# Patient Record
Sex: MALE | Race: WHITE
[De-identification: names, ages, dates, MRNs, and addresses within clinical notes are randomized per-mention and may not be internally consistent; named-entity substitution may affect disease eponyms.]

---

## 2019-01-23 ENCOUNTER — HOSPITAL ENCOUNTER (OUTPATIENT)
Dept: HOSPITAL 46 - ED | Age: 84
Setting detail: OBSERVATION
LOS: 1 days | Discharge: HOME | End: 2019-01-24
Attending: INTERNAL MEDICINE | Admitting: INTERNAL MEDICINE
Payer: MEDICARE

## 2019-01-23 VITALS — HEIGHT: 66 IN | BODY MASS INDEX: 33.12 KG/M2 | WEIGHT: 206.1 LBS

## 2019-01-23 DIAGNOSIS — Z79.891: ICD-10-CM

## 2019-01-23 DIAGNOSIS — G89.29: ICD-10-CM

## 2019-01-23 DIAGNOSIS — Z79.82: ICD-10-CM

## 2019-01-23 DIAGNOSIS — Z95.1: ICD-10-CM

## 2019-01-23 DIAGNOSIS — Z85.46: ICD-10-CM

## 2019-01-23 DIAGNOSIS — R47.01: ICD-10-CM

## 2019-01-23 DIAGNOSIS — Z86.73: ICD-10-CM

## 2019-01-23 DIAGNOSIS — E11.42: ICD-10-CM

## 2019-01-23 DIAGNOSIS — I65.23: ICD-10-CM

## 2019-01-23 DIAGNOSIS — D69.6: ICD-10-CM

## 2019-01-23 DIAGNOSIS — K21.9: ICD-10-CM

## 2019-01-23 DIAGNOSIS — Z79.02: ICD-10-CM

## 2019-01-23 DIAGNOSIS — M54.5: ICD-10-CM

## 2019-01-23 DIAGNOSIS — I95.1: Primary | ICD-10-CM

## 2019-01-23 DIAGNOSIS — Z79.899: ICD-10-CM

## 2019-01-23 DIAGNOSIS — E86.0: ICD-10-CM

## 2019-01-23 DIAGNOSIS — E03.9: ICD-10-CM

## 2019-01-23 DIAGNOSIS — Z79.4: ICD-10-CM

## 2019-01-23 DIAGNOSIS — I25.10: ICD-10-CM

## 2019-01-23 PROCEDURE — G0378 HOSPITAL OBSERVATION PER HR: HCPCS

## 2019-01-23 NOTE — NUR
CALLED TELE PHARMACY, ORDER FOR SLIDING SCALE VERIFIED. GAVE 3 UNITS PER
SLIDING SCALE (SEE MAR). NO FURTHER REQUESTS AT THIS TIME. CALL LIGHT WITHIN
REACH.

## 2019-01-23 NOTE — EKG
[facility]
                                    [address]
                                  [place]  [address]
_________________________________________________________________________________________
                                                                 Signed   
 
 
Sinus rhythm with 1st degree AV block
Otherwise normal ECG
When compared with ECG of 14-NOV-2018 14:00,
No significant change was found
Confirmed by MYRNA SALMERON MD ([id number]) on 1/23/2019 11:00:40 PM
 
 
 
 
 
 
 
 
 
 
 
 
 
 
 
 
 
 
 
 
 
 
 
 
 
 
 
 
 
 
 
 
 
 
 
 
 
 
 
 
    Electronically Signed By: MYRNA SALMERON MD  01/23/19 2300
_________________________________________________________________________________________
PATIENT NAME:     TRENA MAYER                  
MEDICAL RECORD #: [id number]                     Electrocardiogram             
          ACCT #: [id number]  
DATE OF BIRTH:   [birth year]                                       
PHYSICIAN:   MYRNA SALMERON MD           REPORT #: [id number]
REPORT IS CONFIDENTIAL AND NOT TO BE RELEASED WITHOUT AUTHORIZATION

## 2019-01-23 NOTE — NUR
ROUNDED AS CHARGE. PATIENT IS RESTING IN BED. LIDIA RN IN THE ROOM. PATIENT
DENIES ANY COMMENTS, QUESTIONS OR CONCERNS. NO NEEDS NOTED. CALL LIGHT IN
REACH.

## 2019-01-23 NOTE — NUR
RECEIVED REPORT FROM BASIL MURILLO. pt ALERT AND AWAKE. NO DEFICITS NOTED. NO
REQUESTS AT THIS TIME. WAITING FOR SLIDING SCALE INSULIN TO BE VERIFIED BY
PHARMACY. CALL LIGHT WITHIN REACH.

## 2019-01-23 NOTE — NUR
BROUGHT PATIENT FROM ER AT 1945 VIA STRETCHER. NIH STROKE SCALE NEGATIVE.
PATIENT GOT 2 PERCOCET FOR BACK PAIN AND HAS HAD IS OTHER MEDS. PATIENT'S IV
BOTH FLUSH WELL. REPORT GIVEN TO BASIL BOGGS.

## 2019-01-24 NOTE — NUR
CALL LIGHT ON. ASSISTED TO TOILET AND BACK. STATED PAIN 5/10. PRN PAIN
MEDICATION GIVEN (SEE MAR). CALL LIGHT WITHIN REACH. NO FURTHER REQUESTS AT
THIS TIME.

## 2019-01-24 NOTE — NUR
CALL LIGHT ON. ASSISTED pt TO TOILET AND BACK. VSS. DENIES PAIN AT THIS TIME.
CALL LIGHT WITHIN REACH. SCDS ON. IV INFUSING. NO FURTHER REQUESTS AT THIS
TIME.

## 2019-01-24 NOTE — NUR
PATIENT UP TO THE CHAIR, HAS NO C/O PAIN OR NEEDS AT THIS TIME, WIFE PRESENT,
PATIENT STATES THAT HE IS READY TO D/C TO HOME.  AM CARE COMPLETED BY THE CNA.

## 2019-01-24 NOTE — NUR
pt RESTED ON AND OFF DURING SHIFT. NO NEUROLOGICAL DEFICITS NOTED, ORIENTED TO
SELF AND PLACE. DISORIENTED TO DATE. ACCUCHECKS. SBA, FWW. IV RUNNING LR AT
125. SCDS. TELE 7, SINUS RHYTHM. USES CALL LIGHT APPROPRIATELY.

## 2019-01-24 NOTE — NUR
IV PUMP BEEPING. ERROR CORRECTED. ASSESSMENT DONE. NO NEUROLOGICAL DEFICITS
NOTED. NO CHANGES FROM PRIOR ASSESSMENT. CALL LIGHT WITHIN REACH. NO REQUESTS
AT THIS TIME. RATED PAIN 3/10.

## 2019-01-24 NOTE — NUR
CALL LIGHT ON. pt REQUESTED PAIN MEDICATION. STATED PAIN 7/10. PRN MORPHINE
GIVEN (SEE MAR). CALL LIGHT WITHIN REACH. NO FURTHER REQUESTS AT THIS TIME.

## 2019-01-24 NOTE — NUR
PT DRESSED AND READY FOR DC. PHARMACY IN, AND HE IS READY TO GO! WIFE IN WITH
PT, AND LUNCH DELIVERED TO THEM.PLEASANT, EXTENDED A BLESSING, WILL FOLLOW AS
NEEDED

## 2019-01-24 NOTE — NUR
CALL LIGHT ON. ASSISTED TO TOILET, 1PA FWW. BACK TO BED. ASSESSMENT DONE.
STATED PAIN 7/10. PRN MORPHINE GIVEN (SEE MAR). NO DEFICITS NOTED AT THIS
ASSESSMENT. CALL LIGHT WITHIN REACH. NO FURTHER REQUESTS AT THIS TIME.

## 2019-01-24 NOTE — NUR
PATIENT 1 PERSON ASSIST UP TO THE CHAIR FROM THE BED.  HE DENIES ANY DIZZINESS
WHILE UP IN THE ROOM.  HE IS ALERT AND ORIENTED AT THIS TIME, AM MEDICATION
GIVEN